# Patient Record
Sex: MALE | Race: OTHER | NOT HISPANIC OR LATINO | URBAN - METROPOLITAN AREA
[De-identification: names, ages, dates, MRNs, and addresses within clinical notes are randomized per-mention and may not be internally consistent; named-entity substitution may affect disease eponyms.]

---

## 2018-07-21 ENCOUNTER — EMERGENCY (EMERGENCY)
Facility: HOSPITAL | Age: 43
LOS: 1 days | Discharge: HOME | End: 2018-07-21
Attending: EMERGENCY MEDICINE
Payer: SELF-PAY

## 2018-07-21 VITALS
WEIGHT: 184.09 LBS | DIASTOLIC BLOOD PRESSURE: 88 MMHG | TEMPERATURE: 98 F | RESPIRATION RATE: 16 BRPM | HEART RATE: 73 BPM | OXYGEN SATURATION: 98 % | HEIGHT: 68 IN | SYSTOLIC BLOOD PRESSURE: 142 MMHG

## 2018-07-21 PROCEDURE — 99283 EMERGENCY DEPT VISIT LOW MDM: CPT

## 2018-07-21 PROCEDURE — 99283 EMERGENCY DEPT VISIT LOW MDM: CPT | Mod: 25

## 2018-07-21 PROCEDURE — 71046 X-RAY EXAM CHEST 2 VIEWS: CPT | Mod: 26

## 2018-07-21 PROCEDURE — 71046 X-RAY EXAM CHEST 2 VIEWS: CPT

## 2018-07-21 NOTE — ED ADULT NURSE NOTE - OBJECTIVE STATEMENT
pt is here for cough.  pt stated that unproductive cough for 10 days, denies fever and blood with cough, pt calm at this time. lungs are clean

## 2018-07-21 NOTE — ED PROVIDER NOTE - CHPI ED SYMPTOMS NEG
no nausea/no dizziness/no vomiting/no tingling/no decreased eating/drinking/no fever/no chills/no numbness/no pain

## 2018-07-21 NOTE — ED PROVIDER NOTE - OBJECTIVE STATEMENT
43 year old male presents with 10 days of cough.  no fever.  no sob.  cough is nonproductive.  no n/v/d
